# Patient Record
Sex: MALE | Race: WHITE | NOT HISPANIC OR LATINO | URBAN - METROPOLITAN AREA
[De-identification: names, ages, dates, MRNs, and addresses within clinical notes are randomized per-mention and may not be internally consistent; named-entity substitution may affect disease eponyms.]

---

## 2019-03-27 PROBLEM — 428283002 HISTORY OF POLYP OF COLON: Status: ACTIVE | Noted: 2019-03-27

## 2019-03-27 PROBLEM — 13644009 HYPERCHOLESTEROLEMIA: Status: ACTIVE | Noted: 2019-03-27

## 2020-05-04 PROBLEM — 57054005 ACUTE MYOCARDIAL INFARCTION: Status: ACTIVE | Noted: 2020-05-04

## 2020-05-29 LAB — PDFREPORT1: (no result)

## 2020-06-01 ENCOUNTER — LAB OUTSIDE AN ENCOUNTER (OUTPATIENT)
Dept: URBAN - METROPOLITAN AREA CLINIC 13 | Facility: CLINIC | Age: 65
End: 2020-06-01

## 2020-06-05 ENCOUNTER — OFFICE VISIT (OUTPATIENT)
Dept: URBAN - METROPOLITAN AREA CLINIC 13 | Facility: CLINIC | Age: 65
End: 2020-06-05

## 2020-06-17 ENCOUNTER — OFFICE VISIT (OUTPATIENT)
Dept: URBAN - METROPOLITAN AREA CLINIC 13 | Facility: CLINIC | Age: 65
End: 2020-06-17

## 2020-07-29 ENCOUNTER — OFFICE VISIT (OUTPATIENT)
Dept: URBAN - METROPOLITAN AREA CLINIC 46 | Facility: CLINIC | Age: 65
End: 2020-07-29

## 2021-02-09 ENCOUNTER — OFFICE VISIT (OUTPATIENT)
Dept: URBAN - METROPOLITAN AREA CLINIC 44 | Facility: CLINIC | Age: 66
End: 2021-02-09

## 2021-08-28 ENCOUNTER — TELEPHONE ENCOUNTER (OUTPATIENT)
Dept: URBAN - METROPOLITAN AREA CLINIC 13 | Facility: CLINIC | Age: 66
End: 2021-08-28

## 2021-08-28 RX ORDER — PANTOPRAZOLE SODIUM 40 MG/1
GRANULE, DELAYED RELEASE ORAL
OUTPATIENT
Start: 2020-05-29 | End: 2020-07-29

## 2021-08-28 RX ORDER — FAMOTIDINE 20 MG/1
TABLET, FILM COATED ORAL
OUTPATIENT
End: 2021-02-09

## 2021-08-28 RX ORDER — FAMOTIDINE 40 MG/1
TABLET, FILM COATED ORAL
OUTPATIENT
End: 2021-02-09

## 2021-08-28 RX ORDER — ESOMEPRAZOLE MAGNESIUM 40 MG/1
FOR SUSPENSION ORAL
OUTPATIENT
End: 2020-05-29

## 2021-08-28 RX ORDER — OMEPRAZOLE 40 MG/1
CAPSULE, DELAYED RELEASE ORAL
OUTPATIENT
Start: 2021-02-05 | End: 2021-02-09

## 2021-08-29 ENCOUNTER — TELEPHONE ENCOUNTER (OUTPATIENT)
Dept: URBAN - METROPOLITAN AREA CLINIC 13 | Facility: CLINIC | Age: 66
End: 2021-08-29

## 2021-08-29 RX ORDER — DEXLANSOPRAZOLE 60 MG/1
CAPSULE, DELAYED RELEASE ORAL
Status: ACTIVE | COMMUNITY
Start: 2020-06-18

## 2021-08-29 RX ORDER — METOPROLOL SUCCINATE 25 MG/1
TABLET, EXTENDED RELEASE ORAL
Status: ACTIVE | COMMUNITY

## 2021-08-29 RX ORDER — SIMVASTATIN 40 MG/1
TABLET, FILM COATED ORAL
Status: ACTIVE | COMMUNITY

## 2021-08-29 RX ORDER — LOSARTAN POTASSIUM 100 MG/1
TABLET, FILM COATED ORAL
Status: ACTIVE | COMMUNITY

## 2021-08-29 RX ORDER — CHLORTHALIDONE 25 MG/1
TABLET ORAL
Status: ACTIVE | COMMUNITY

## 2021-08-29 RX ORDER — ASPIRIN 81 MG/1
TABLET ORAL
Status: ACTIVE | COMMUNITY

## 2021-08-29 RX ORDER — PRASUGREL 10 MG/1
TABLET, FILM COATED ORAL
Status: ACTIVE | COMMUNITY

## 2021-12-16 ENCOUNTER — OFFICE VISIT (OUTPATIENT)
Dept: URBAN - METROPOLITAN AREA CLINIC 46 | Facility: CLINIC | Age: 66
End: 2021-12-16
Payer: MEDICARE

## 2021-12-16 VITALS
BODY MASS INDEX: 31.47 KG/M2 | DIASTOLIC BLOOD PRESSURE: 72 MMHG | HEIGHT: 71 IN | TEMPERATURE: 97.9 F | WEIGHT: 224.8 LBS | OXYGEN SATURATION: 98 % | HEART RATE: 52 BPM | SYSTOLIC BLOOD PRESSURE: 131 MMHG

## 2021-12-16 DIAGNOSIS — K64.4 EXTERNAL HEMORRHOID: ICD-10-CM

## 2021-12-16 DIAGNOSIS — K21.00 GASTROESOPHAGEAL REFLUX DISEASE WITH ESOPHAGITIS, UNSPECIFIED WHETHER HEMORRHAGE: ICD-10-CM

## 2021-12-16 PROBLEM — 302914006 BARRETT'S ESOPHAGUS: Status: ACTIVE | Noted: 2021-12-16

## 2021-12-16 PROCEDURE — 99213 OFFICE O/P EST LOW 20 MIN: CPT | Performed by: INTERNAL MEDICINE

## 2021-12-16 RX ORDER — METOPROLOL SUCCINATE 25 MG/1
1 TABLET TABLET, EXTENDED RELEASE ORAL ONCE A DAY
Status: ACTIVE | COMMUNITY

## 2021-12-16 RX ORDER — SIMVASTATIN 40 MG/1
1 TABLET IN THE EVENING TABLET, FILM COATED ORAL ONCE A DAY
Status: ACTIVE | COMMUNITY

## 2021-12-16 RX ORDER — LOSARTAN POTASSIUM 100 MG/1
1 TABLET TABLET, FILM COATED ORAL ONCE A DAY
Status: ACTIVE | COMMUNITY

## 2021-12-16 RX ORDER — DEXLANSOPRAZOLE 60 MG/1
1 CAPSULE CAPSULE, DELAYED RELEASE ORAL ONCE A DAY
Qty: 90 | Refills: 5
Start: 2020-06-18

## 2021-12-16 RX ORDER — DEXLANSOPRAZOLE 60 MG/1
1 CAPSULE CAPSULE, DELAYED RELEASE ORAL ONCE A DAY
Status: ACTIVE | COMMUNITY
Start: 2020-06-18

## 2021-12-16 RX ORDER — PRASUGREL 10 MG/1
TABLET, FILM COATED ORAL
Status: DISCONTINUED | COMMUNITY

## 2021-12-16 RX ORDER — ASPIRIN 81 MG/1
1 TABLET TABLET ORAL ONCE A DAY
Status: ACTIVE | COMMUNITY

## 2021-12-16 RX ORDER — CHLORTHALIDONE 25 MG/1
1 TABLET IN THE MORNING WITH FOOD TABLET ORAL EVERY OTHER DAY
Status: ACTIVE | COMMUNITY

## 2021-12-16 RX ORDER — CLOPIDOGREL 75 MG/1
1 TABLET TABLET, FILM COATED ORAL ONCE A DAY
Status: ACTIVE | COMMUNITY

## 2021-12-16 NOTE — HPI-H. PYLORI
Ga Santillan is a 66 y/o male who presents for follow up evaluation for GERD. EGD on 5/29/20 revealed carditis(no Valera's) and mild gastritis.  He presents today c/o chronic reflux that he describes as mostly in the am and lunch time. Associates occasional pain the chests and bloating, belching seems to help. Dexilant 60 mg bid seems to be the only regimen that completely works for him.   Today, he mentions that he has weaned himself to daily Dexilant which works well.   He also mentions external hemorrhoids that itch. Denies constipation or bleeding.   His son is doing better after rounds of chemo/immunotherapy for stage 4 esophageal CA (told this was secondary to chronic reflux and hiatal hernia).

## 2022-01-06 ENCOUNTER — DASHBOARD ENCOUNTERS (OUTPATIENT)
Age: 67
End: 2022-01-06

## 2022-01-07 ENCOUNTER — LAB OUTSIDE AN ENCOUNTER (OUTPATIENT)
Dept: URBAN - METROPOLITAN AREA CLINIC 44 | Facility: CLINIC | Age: 67
End: 2022-01-07

## 2022-01-07 ENCOUNTER — OFFICE VISIT (OUTPATIENT)
Dept: URBAN - METROPOLITAN AREA CLINIC 44 | Facility: CLINIC | Age: 67
End: 2022-01-07
Payer: MEDICARE

## 2022-01-07 VITALS
HEIGHT: 71 IN | BODY MASS INDEX: 31.39 KG/M2 | TEMPERATURE: 97.9 F | HEART RATE: 52 BPM | OXYGEN SATURATION: 97 % | WEIGHT: 224.2 LBS | DIASTOLIC BLOOD PRESSURE: 83 MMHG | SYSTOLIC BLOOD PRESSURE: 133 MMHG

## 2022-01-07 DIAGNOSIS — K21.00 GASTROESOPHAGEAL REFLUX DISEASE WITH ESOPHAGITIS, UNSPECIFIED WHETHER HEMORRHAGE: ICD-10-CM

## 2022-01-07 DIAGNOSIS — R07.89 NON-CARDIAC CHEST PAIN: ICD-10-CM

## 2022-01-07 PROBLEM — 266433003: Status: ACTIVE | Noted: 2021-12-16

## 2022-01-07 PROCEDURE — 99213 OFFICE O/P EST LOW 20 MIN: CPT | Performed by: INTERNAL MEDICINE

## 2022-01-07 RX ORDER — SILDENAFIL 100 MG/1
1 TABLET AS NEEDED TABLET, FILM COATED ORAL ONCE A DAY
Status: ACTIVE | COMMUNITY

## 2022-01-07 RX ORDER — CLOPIDOGREL 75 MG/1
1 TABLET TABLET, FILM COATED ORAL ONCE A DAY
Status: DISCONTINUED | COMMUNITY

## 2022-01-07 RX ORDER — PRASUGREL 10 MG/1
1 TABLET TABLET, FILM COATED ORAL
Status: ACTIVE | COMMUNITY

## 2022-01-07 RX ORDER — ASPIRIN 81 MG/1
1 TABLET TABLET ORAL ONCE A DAY
Status: ACTIVE | COMMUNITY

## 2022-01-07 RX ORDER — SIMVASTATIN 40 MG/1
1 TABLET IN THE EVENING TABLET, FILM COATED ORAL ONCE A DAY
Status: ACTIVE | COMMUNITY

## 2022-01-07 RX ORDER — LOSARTAN POTASSIUM 100 MG/1
1 TABLET TABLET, FILM COATED ORAL ONCE A DAY
Status: ACTIVE | COMMUNITY

## 2022-01-07 RX ORDER — METOPROLOL SUCCINATE 25 MG/1
1 TABLET TABLET, EXTENDED RELEASE ORAL ONCE A DAY
COMMUNITY

## 2022-01-07 RX ORDER — CHLORTHALIDONE 25 MG/1
1 TABLET IN THE MORNING WITH FOOD TABLET ORAL EVERY OTHER DAY
Status: ACTIVE | COMMUNITY

## 2022-01-07 RX ORDER — DEXLANSOPRAZOLE 60 MG/1
1 CAPSULE CAPSULE, DELAYED RELEASE ORAL ONCE A DAY
Qty: 90 | Refills: 5 | Status: ACTIVE | COMMUNITY
Start: 2020-06-18

## 2022-01-07 NOTE — HPI-H. PYLORI
Ga Santillan is a 65 y/o male who presents for follow up evaluation for dyspepsia. Initially, we were following the patient for chronic refractory GERD. EGD on 5/29/20 revealed carditis(esophageal biopsies negative for Valera's) and mild gastritis(H pylori negative).   On the last  visit one month ago, he mentions that his GERD appeared to be well controlled with daily Dexilant 60 mg and he had successfully weaned himself from bid dosing per our recommendations.  He has chronic reflux that he describes as mostly in the am and lunch time. Associates occasional pain in the chest and bloating; belching seems to help.  Recently, he mentions proceeding to the [facility] for chest pressure on 3 different occasions since our last visit. Workups each time did not reveal any cardiac etiology. He continues on ASA and Plavix.   I reviewed with him the likelihood of refractory GERD or biliary colic being a possible culprit. We reviewed both syndroms and discussed potential workups to include a Bravo test and US of the gallbladder to start; respectively.   Upon further disussion of his meals and regimen, he states that he eats 3 eggs and several slices of hard cheese every morning for breakfast. This seems to correlate closely with his chest pressure as he experiences symptoms an hour or so before lunch. I discussed changing his diet and seeing if this would help and he agrees to the trial.   Of note, he has a son with stage 4  esopageal cancer(2/2 chronic reflux) and has been undergoing rounds of chemo/immunotherapy.   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 He was DC'd home on aspirin and Prasugrel but now takes Plavis and ASA instead. His cardiologist is Dr. Montalvo in [place], whom he follows with regularly.

## 2022-02-22 ENCOUNTER — TELEPHONE ENCOUNTER (OUTPATIENT)
Dept: URBAN - METROPOLITAN AREA CLINIC 46 | Facility: CLINIC | Age: 67
End: 2022-02-22